# Patient Record
Sex: FEMALE | Race: WHITE | NOT HISPANIC OR LATINO | Employment: OTHER | URBAN - NONMETROPOLITAN AREA
[De-identification: names, ages, dates, MRNs, and addresses within clinical notes are randomized per-mention and may not be internally consistent; named-entity substitution may affect disease eponyms.]

---

## 2017-10-31 ENCOUNTER — OFFICE VISIT (OUTPATIENT)
Dept: URGENT CARE | Facility: PHYSICIAN GROUP | Age: 62
End: 2017-10-31
Payer: OTHER MISCELLANEOUS

## 2017-10-31 VITALS
OXYGEN SATURATION: 94 % | SYSTOLIC BLOOD PRESSURE: 130 MMHG | TEMPERATURE: 97.2 F | HEART RATE: 88 BPM | BODY MASS INDEX: 35.51 KG/M2 | DIASTOLIC BLOOD PRESSURE: 80 MMHG | WEIGHT: 208 LBS | HEIGHT: 64 IN | RESPIRATION RATE: 18 BRPM

## 2017-10-31 DIAGNOSIS — R19.7 DIARRHEA, UNSPECIFIED TYPE: ICD-10-CM

## 2017-10-31 DIAGNOSIS — R10.84 GENERALIZED ABDOMINAL PAIN: ICD-10-CM

## 2017-10-31 DIAGNOSIS — R10.30 LOWER ABDOMINAL PAIN: ICD-10-CM

## 2017-10-31 DIAGNOSIS — E66.9 OBESITY (BMI 35.0-39.9 WITHOUT COMORBIDITY): ICD-10-CM

## 2017-10-31 LAB
APPEARANCE UR: CLEAR
BILIRUB UR STRIP-MCNC: NORMAL MG/DL
COLOR UR AUTO: YELLOW
GLUCOSE UR STRIP.AUTO-MCNC: NORMAL MG/DL
KETONES UR STRIP.AUTO-MCNC: NORMAL MG/DL
LEUKOCYTE ESTERASE UR QL STRIP.AUTO: NORMAL
NITRITE UR QL STRIP.AUTO: NORMAL
PH UR STRIP.AUTO: 6 [PH] (ref 5–8)
PROT UR QL STRIP: NORMAL MG/DL
RBC UR QL AUTO: NORMAL
SP GR UR STRIP.AUTO: 1.03
UROBILINOGEN UR STRIP-MCNC: NORMAL MG/DL

## 2017-10-31 PROCEDURE — 99214 OFFICE O/P EST MOD 30 MIN: CPT | Performed by: FAMILY MEDICINE

## 2017-10-31 PROCEDURE — 81002 URINALYSIS NONAUTO W/O SCOPE: CPT | Performed by: FAMILY MEDICINE

## 2017-10-31 RX ORDER — CIPROFLOXACIN 500 MG/1
TABLET, FILM COATED ORAL
Qty: 10 TAB | Refills: 0 | Status: SHIPPED | OUTPATIENT
Start: 2017-10-31 | End: 2017-11-08

## 2017-10-31 RX ORDER — DIPHENOXYLATE HYDROCHLORIDE AND ATROPINE SULFATE 2.5; .025 MG/1; MG/1
TABLET ORAL
Qty: 20 TAB | Refills: 0 | Status: SHIPPED | OUTPATIENT
Start: 2017-10-31 | End: 2017-11-08

## 2017-10-31 NOTE — PROGRESS NOTES
Chief Complaint:    Chief Complaint   Patient presents with   • Abdominal Pain     cramping, dizziness       History of Present Illness:    This is a new problem. On 10/28/17, she started with watery diarrhea. She is still having diarrhea, 3 times yesterday, and 4 times so far today. Tried Pepto Bismol on 10/28/17 without help. Has fluctuating abdominal pain. She reports the pain has at times been so bad that it's doubled her over. Sometimes she has felt dizzy. No fever, chills, nausea, vomiting, or urine symptoms. S/p appendectomy and hysterectomy, otherwise has all abdominal organs. No close contacts with similar symptoms, recent travel, water activities such as lakes or rivers, or recent antibiotic use.      Review of Systems:    Constitutional: Negative for fever, chills, and diaphoresis.   Eyes: Negative for change in vision, photophobia, pain, redness, and discharge.  ENT: Negative for ear pain, ear discharge, hearing loss, tinnitus, nasal congestion, nosebleeds, and sore throat.    Respiratory: Negative for cough, hemoptysis, sputum production, shortness of breath, wheezing, and stridor.    Cardiovascular: Negative for chest pain, palpitations, orthopnea, claudication, leg swelling, and PND.   Gastrointestinal: See HPI.  Genitourinary: Negative for dysuria, urinary urgency, urinary frequency, hematuria, and flank pain.   Musculoskeletal: Negative for myalgias, joint pain, neck pain, and back pain.   Skin: Negative for rash and itching.   Neurological: See HPI.  Endo: Negative for polydipsia.   Heme: Does not bruise/bleed easily.   Psychiatric/Behavioral: Negative for depression, suicidal ideas, hallucinations, memory loss and substance abuse. The patient is not nervous/anxious and does not have insomnia.      Past Medical History:    History reviewed. No pertinent past medical history.    Past Surgical History:    Past Surgical History:   Procedure Laterality Date   • ABDOMINAL HYSTERECTOMY TOTAL     •  "APPENDECTOMY     • OPEN REDUCTION     • PRIMARY C SECTION         Social History:    Social History     Social History   • Marital status:      Spouse name: N/A   • Number of children: N/A   • Years of education: N/A     Social History Main Topics   • Smoking status: Current Every Day Smoker     Packs/day: 0.25     Types: Cigarettes   • Smokeless tobacco: Never Used   • Alcohol use Yes      Comment: rare   • Drug use: No   • Sexual activity: Yes     Partners: Male     Other Topics Concern   • Not on file     Social History Narrative   • No narrative on file       Family History:    History reviewed. No pertinent family history.    Medications:    Current Outpatient Prescriptions on File Prior to Visit   Medication Sig Dispense Refill   • conjugated estrogen (PREMARIN) 1.25 MG TABS Take 1.25 mg by mouth every day. DAYS 1-25        No current facility-administered medications on file prior to visit.        Allergies:    Allergies   Allergen Reactions   • Pcn [Penicillins]        Vitals:    Vitals:    10/31/17 0905   BP: 130/80   Pulse: 88   Resp: 18   Temp: 36.2 °C (97.2 °F)   SpO2: 94%   Weight: 94.3 kg (208 lb)   Height: 1.626 m (5' 4\")       Physical Exam:    Constitutional: Vital signs reviewed. Appears well-developed and well-nourished. No acute distress.   Eyes: Sclera white, conjunctivae clear. PERRLA.  ENT: External ears normal. External auditory canals normal without discharge. TMs translucent and non-bulging. Hearing normal. Nasal mucosa pink. Lips/teeth are normal. Oral mucosa pink and moist. Posterior pharynx: WNL.  Neck: Neck supple.   Cardiovascular: Regular rate and rhythm. No murmur.   Pulmonary/Chest: Respirations non-labored. Clear to auscultation bilaterally.  Abdomen: Mild generalized tenderness to palpation, she reports worst discomfort is in suprapubic region. Bowel sounds are hyperactive. Soft, non-distended.  Musculoskeletal: Normal gait. Normal range of motion. No muscular atrophy or " weakness.  Neurological: Alert and oriented to person, place, and time. CN 2-12 intact. Muscle tone normal. Coordination normal.   Skin: No rashes or lesions. Warm, dry, normal turgor.  Psychiatric: Normal mood and affect. Behavior is normal. Judgment and thought content normal.     Diagnostics:    POCT URINALYSIS (Order #[id number]) on 10/31/17   Component Results     Component Value Ref Range & Units Status   POC Color yellow Negative Final   POC Appearance clear Negative Final   POC Leukocyte Esterase neg Negative Final   POC Nitrites neg Negative Final   POC Urobiligen neg Negative (0.2) mg/dL Final   POC Protein neg Negative mg/dL Final   POC Urine PH 6.0 5.0 - 8.0 Final   POC Blood neg Negative Final   POC Specific Gravity 1.030 <1.005 - >1.030 Final   POC Ketones neg Negative mg/dL Final   POC Biliruben neg Negative mg/dL Final   POC Glucose neg Negative mg/dL Final   Last Resulted Time   Tue Oct 31, 2017  9:48 AM       Assessment / Plan:    1. Lower abdominal pain  - POCT Urinalysis    2. Diarrhea, unspecified type  - diphenoxylate-atropine (LOMOTIL) 2.5-0.025 MG Tab; 1 TAB EVERY 6 HOURS ONLY IF NEEDED FOR DIARRHEA OR STOMACH CRAMPS.  Dispense: 20 Tab; Refill: 0  - ciprofloxacin (CIPRO) 500 MG Tab; 1 TAB TWICE A DAY X 5 DAYS.  Dispense: 10 Tab; Refill: 0    3. Generalized abdominal pain  - diphenoxylate-atropine (LOMOTIL) 2.5-0.025 MG Tab; 1 TAB EVERY 6 HOURS ONLY IF NEEDED FOR DIARRHEA OR STOMACH CRAMPS.  Dispense: 20 Tab; Refill: 0  - ciprofloxacin (CIPRO) 500 MG Tab; 1 TAB TWICE A DAY X 5 DAYS.  Dispense: 10 Tab; Refill: 0    4. Obesity (BMI 35.0-39.9 without comorbidity)  - Patient identified as having weight management issue.  Appropriate orders and counseling given.      Work note given - was seen in my clinic on 10/31/2017 for medical condition.    Discussed with her DDX and management options.    Discussed possible further evaluation with CT Abdomen and Pelvis, which usually will have to be done in  [place] (as we do not have CT here) and likely will need kidney blood tests done first (likely needs to be done in [place] as we do not have STAT labs here) vs. trial of medications and pursue imaging later if getting worse or not improved with meds.    She prefers to take meds first and defer blood tests/CT for now.    Agreeable to medications prescribed.  report checked - last Rx was Vicodin 5-300 mg #60 on 8/10/15.    Follow-up with PCP, urgent care, or Emergency Room if getting worse or not better with above.

## 2017-10-31 NOTE — LETTER
October 31, 2017         Patient: Loree Rm   YOB: 1955   Date of Visit: 10/31/2017           To Whom it May Concern:    Loree Rm was seen in my clinic on 10/31/2017 for medical condition.    If you have any questions or concerns, please don't hesitate to call.        Sincerely,           Boubacar Turner M.D.  Electronically Signed

## 2017-11-08 ENCOUNTER — APPOINTMENT (OUTPATIENT)
Dept: RADIOLOGY | Facility: MEDICAL CENTER | Age: 62
End: 2017-11-08
Attending: EMERGENCY MEDICINE
Payer: MEDICAID

## 2017-11-08 ENCOUNTER — OFFICE VISIT (OUTPATIENT)
Dept: URGENT CARE | Facility: PHYSICIAN GROUP | Age: 62
End: 2017-11-08
Payer: MEDICAID

## 2017-11-08 ENCOUNTER — HOSPITAL ENCOUNTER (OUTPATIENT)
Facility: MEDICAL CENTER | Age: 62
End: 2017-11-09
Attending: EMERGENCY MEDICINE | Admitting: HOSPITALIST
Payer: MEDICAID

## 2017-11-08 ENCOUNTER — RESOLUTE PROFESSIONAL BILLING HOSPITAL PROF FEE (OUTPATIENT)
Dept: HOSPITALIST | Facility: MEDICAL CENTER | Age: 62
End: 2017-11-08
Payer: MEDICAID

## 2017-11-08 VITALS
WEIGHT: 206 LBS | HEART RATE: 96 BPM | RESPIRATION RATE: 16 BRPM | BODY MASS INDEX: 35.17 KG/M2 | TEMPERATURE: 97.3 F | HEIGHT: 64 IN | SYSTOLIC BLOOD PRESSURE: 134 MMHG | OXYGEN SATURATION: 97 % | DIASTOLIC BLOOD PRESSURE: 88 MMHG

## 2017-11-08 DIAGNOSIS — R07.9 CHEST PAIN, UNSPECIFIED TYPE: ICD-10-CM

## 2017-11-08 DIAGNOSIS — R07.89 CHEST DISCOMFORT: ICD-10-CM

## 2017-11-08 LAB
ALBUMIN SERPL BCP-MCNC: 3.9 G/DL (ref 3.2–4.9)
ALBUMIN/GLOB SERPL: 1.1 G/DL
ALP SERPL-CCNC: 203 U/L (ref 30–99)
ALT SERPL-CCNC: 23 U/L (ref 2–50)
ANION GAP SERPL CALC-SCNC: 9 MMOL/L (ref 0–11.9)
APTT PPP: 28.7 SEC (ref 24.7–36)
AST SERPL-CCNC: 44 U/L (ref 12–45)
BASOPHILS # BLD AUTO: 1.4 % (ref 0–1.8)
BASOPHILS # BLD: 0.12 K/UL (ref 0–0.12)
BILIRUB SERPL-MCNC: 0.4 MG/DL (ref 0.1–1.5)
BNP SERPL-MCNC: 2 PG/ML (ref 0–100)
BUN SERPL-MCNC: 17 MG/DL (ref 8–22)
CALCIUM SERPL-MCNC: 9.3 MG/DL (ref 8.5–10.5)
CHLORIDE SERPL-SCNC: 107 MMOL/L (ref 96–112)
CO2 SERPL-SCNC: 21 MMOL/L (ref 20–33)
CREAT SERPL-MCNC: 0.55 MG/DL (ref 0.5–1.4)
EKG IMPRESSION: NORMAL
EOSINOPHIL # BLD AUTO: 0.49 K/UL (ref 0–0.51)
EOSINOPHIL NFR BLD: 5.6 % (ref 0–6.9)
ERYTHROCYTE [DISTWIDTH] IN BLOOD BY AUTOMATED COUNT: 45.8 FL (ref 35.9–50)
GFR SERPL CREATININE-BSD FRML MDRD: >60 ML/MIN/1.73 M 2
GLOBULIN SER CALC-MCNC: 3.6 G/DL (ref 1.9–3.5)
GLUCOSE SERPL-MCNC: 95 MG/DL (ref 65–99)
HCT VFR BLD AUTO: 44.1 % (ref 37–47)
HGB BLD-MCNC: 14.8 G/DL (ref 12–16)
IMM GRANULOCYTES # BLD AUTO: 0.04 K/UL (ref 0–0.11)
IMM GRANULOCYTES NFR BLD AUTO: 0.5 % (ref 0–0.9)
INR PPP: 1.03 (ref 0.87–1.13)
LIPASE SERPL-CCNC: 27 U/L (ref 11–82)
LYMPHOCYTES # BLD AUTO: 2.7 K/UL (ref 1–4.8)
LYMPHOCYTES NFR BLD: 31 % (ref 22–41)
MCH RBC QN AUTO: 33.2 PG (ref 27–33)
MCHC RBC AUTO-ENTMCNC: 33.6 G/DL (ref 33.6–35)
MCV RBC AUTO: 98.9 FL (ref 81.4–97.8)
MONOCYTES # BLD AUTO: 0.64 K/UL (ref 0–0.85)
MONOCYTES NFR BLD AUTO: 7.3 % (ref 0–13.4)
NEUTROPHILS # BLD AUTO: 4.73 K/UL (ref 2–7.15)
NEUTROPHILS NFR BLD: 54.2 % (ref 44–72)
NRBC # BLD AUTO: 0 K/UL
NRBC BLD AUTO-RTO: 0 /100 WBC
PLATELET # BLD AUTO: 322 K/UL (ref 164–446)
PMV BLD AUTO: 10.1 FL (ref 9–12.9)
POTASSIUM SERPL-SCNC: 4 MMOL/L (ref 3.6–5.5)
PROT SERPL-MCNC: 7.5 G/DL (ref 6–8.2)
PROTHROMBIN TIME: 13.2 SEC (ref 12–14.6)
RBC # BLD AUTO: 4.46 M/UL (ref 4.2–5.4)
SODIUM SERPL-SCNC: 137 MMOL/L (ref 135–145)
TROPONIN I SERPL-MCNC: <0.01 NG/ML (ref 0–0.04)
WBC # BLD AUTO: 8.7 K/UL (ref 4.8–10.8)

## 2017-11-08 PROCEDURE — 93000 ELECTROCARDIOGRAM COMPLETE: CPT | Performed by: FAMILY MEDICINE

## 2017-11-08 PROCEDURE — 99214 OFFICE O/P EST MOD 30 MIN: CPT | Performed by: FAMILY MEDICINE

## 2017-11-08 PROCEDURE — 99220 PR INITIAL OBSERVATION CARE,LEVL III: CPT | Performed by: HOSPITALIST

## 2017-11-08 PROCEDURE — 83690 ASSAY OF LIPASE: CPT

## 2017-11-08 PROCEDURE — 83880 ASSAY OF NATRIURETIC PEPTIDE: CPT

## 2017-11-08 PROCEDURE — 85730 THROMBOPLASTIN TIME PARTIAL: CPT

## 2017-11-08 PROCEDURE — 80053 COMPREHEN METABOLIC PANEL: CPT

## 2017-11-08 PROCEDURE — 84484 ASSAY OF TROPONIN QUANT: CPT

## 2017-11-08 PROCEDURE — G0378 HOSPITAL OBSERVATION PER HR: HCPCS

## 2017-11-08 PROCEDURE — 99285 EMERGENCY DEPT VISIT HI MDM: CPT

## 2017-11-08 PROCEDURE — 85610 PROTHROMBIN TIME: CPT

## 2017-11-08 PROCEDURE — 36415 COLL VENOUS BLD VENIPUNCTURE: CPT

## 2017-11-08 PROCEDURE — 71010 DX-CHEST-LIMITED (1 VIEW): CPT

## 2017-11-08 PROCEDURE — 85025 COMPLETE CBC W/AUTO DIFF WBC: CPT

## 2017-11-08 PROCEDURE — 93005 ELECTROCARDIOGRAM TRACING: CPT | Performed by: EMERGENCY MEDICINE

## 2017-11-08 RX ORDER — CIPROFLOXACIN 500 MG/1
500 TABLET, FILM COATED ORAL 2 TIMES DAILY
COMMUNITY
Start: 2017-10-31 | End: 2019-03-05

## 2017-11-08 RX ORDER — PROMETHAZINE HYDROCHLORIDE 25 MG/1
12.5-25 TABLET ORAL EVERY 4 HOURS PRN
Status: DISCONTINUED | OUTPATIENT
Start: 2017-11-08 | End: 2017-11-09 | Stop reason: HOSPADM

## 2017-11-08 RX ORDER — POLYETHYLENE GLYCOL 3350 17 G/17G
1 POWDER, FOR SOLUTION ORAL
Status: DISCONTINUED | OUTPATIENT
Start: 2017-11-08 | End: 2017-11-09 | Stop reason: HOSPADM

## 2017-11-08 RX ORDER — ONDANSETRON 2 MG/ML
4 INJECTION INTRAMUSCULAR; INTRAVENOUS EVERY 4 HOURS PRN
Status: DISCONTINUED | OUTPATIENT
Start: 2017-11-08 | End: 2017-11-09 | Stop reason: HOSPADM

## 2017-11-08 RX ORDER — ASPIRIN 81 MG/1
324 TABLET, CHEWABLE ORAL ONCE
Status: COMPLETED | OUTPATIENT
Start: 2017-11-08 | End: 2017-11-08

## 2017-11-08 RX ORDER — IBUPROFEN 200 MG
400 TABLET ORAL 3 TIMES DAILY
COMMUNITY
End: 2019-03-05

## 2017-11-08 RX ORDER — DIPHENOXYLATE HYDROCHLORIDE AND ATROPINE SULFATE 2.5; .025 MG/1; MG/1
1 TABLET ORAL 4 TIMES DAILY PRN
COMMUNITY
End: 2019-03-05

## 2017-11-08 RX ORDER — AMOXICILLIN 250 MG
2 CAPSULE ORAL 2 TIMES DAILY
Status: DISCONTINUED | OUTPATIENT
Start: 2017-11-08 | End: 2017-11-09 | Stop reason: HOSPADM

## 2017-11-08 RX ORDER — BISACODYL 10 MG
10 SUPPOSITORY, RECTAL RECTAL
Status: DISCONTINUED | OUTPATIENT
Start: 2017-11-08 | End: 2017-11-09 | Stop reason: HOSPADM

## 2017-11-08 RX ORDER — PROMETHAZINE HYDROCHLORIDE 25 MG/1
12.5-25 SUPPOSITORY RECTAL EVERY 4 HOURS PRN
Status: DISCONTINUED | OUTPATIENT
Start: 2017-11-08 | End: 2017-11-09 | Stop reason: HOSPADM

## 2017-11-08 RX ORDER — ONDANSETRON 4 MG/1
4 TABLET, ORALLY DISINTEGRATING ORAL EVERY 4 HOURS PRN
Status: DISCONTINUED | OUTPATIENT
Start: 2017-11-08 | End: 2017-11-09 | Stop reason: HOSPADM

## 2017-11-08 RX ADMIN — ASPIRIN 324 MG: 81 TABLET, CHEWABLE ORAL at 10:00

## 2017-11-08 ASSESSMENT — PATIENT HEALTH QUESTIONNAIRE - PHQ9
SUM OF ALL RESPONSES TO PHQ9 QUESTIONS 1 AND 2: 0
1. LITTLE INTEREST OR PLEASURE IN DOING THINGS: NOT AT ALL
1. LITTLE INTEREST OR PLEASURE IN DOING THINGS: NOT AT ALL
SUM OF ALL RESPONSES TO PHQ QUESTIONS 1-9: 0
SUM OF ALL RESPONSES TO PHQ9 QUESTIONS 1 AND 2: 0
SUM OF ALL RESPONSES TO PHQ QUESTIONS 1-9: 0
2. FEELING DOWN, DEPRESSED, IRRITABLE, OR HOPELESS: NOT AT ALL
2. FEELING DOWN, DEPRESSED, IRRITABLE, OR HOPELESS: NOT AT ALL

## 2017-11-08 ASSESSMENT — ENCOUNTER SYMPTOMS
FOCAL WEAKNESS: 0
NAUSEA: 0
SHORTNESS OF BREATH: 0
CHILLS: 0
FEVER: 0
WHEEZING: 0
DIZZINESS: 0
ORTHOPNEA: 0
BACK PAIN: 1
VOMITING: 0
DIAPHORESIS: 1
SPUTUM PRODUCTION: 0
COUGH: 0

## 2017-11-08 ASSESSMENT — PAIN SCALES - GENERAL
PAINLEVEL_OUTOF10: 2
PAINLEVEL_OUTOF10: 3
PAINLEVEL_OUTOF10: 2

## 2017-11-08 ASSESSMENT — LIFESTYLE VARIABLES
ALCOHOL_USE: NO
DO YOU DRINK ALCOHOL: NO
EVER_SMOKED: YES

## 2017-11-08 NOTE — PROGRESS NOTES
"Subjective:      Loree Rm is a 62 y.o. female who presents with Chills and Back Pain (chest tightness)    Chief Complaint   Patient presents with   • Chills   • Back Pain     chest tightness        - This is a very pleasant 62 y.o. 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 Radiates to back, no nausea but felt clammy/sweaty a little. Symptoms still present but pain is 5/10. She is a smoker.           ALLERGIES:  Pcn [penicillins]     PMH:  No past medical history on file.     MEDS:  No current outpatient prescriptions on file.    ** I have documented what I find to be significant in regards to past medical, social, family and surgical history  in my HPI or under PMH/PSH/FH review section, otherwise it is contributory **           HPI    Review of Systems   Constitutional: Positive for diaphoresis. Negative for chills and fever.   Respiratory: Negative for cough, sputum production, shortness of breath and wheezing.    Cardiovascular: Positive for chest pain. Negative for orthopnea and leg swelling.   Gastrointestinal: Negative for nausea and vomiting.   Musculoskeletal: Positive for back pain.   Neurological: Negative for dizziness and focal weakness.          Objective:     /88   Pulse 96   Temp 36.3 °C (97.3 °F)   Resp 16   Ht 1.626 m (5' 4\")   Wt 93.4 kg (206 lb)   SpO2 97%   BMI 35.36 kg/m²      Physical Exam   Constitutional: She appears well-developed. No distress.   HENT:   Head: Normocephalic and atraumatic.   Mouth/Throat: Oropharynx is clear and moist.   Eyes: Conjunctivae are normal.   Neck: Neck supple.   Cardiovascular: Regular rhythm.    No murmur heard.  Pulmonary/Chest: Effort normal and breath sounds normal. No respiratory distress.   Neurological: She is alert. She exhibits normal muscle tone.   Skin: Skin is warm and dry.   Psychiatric: She has a normal mood and affect. Judgment normal.   Nursing note and " vitals reviewed.              Assessment/Plan:         1. Chest discomfort  EKG - Clinic performed    aspirin (ASA) chewable tab 324 mg       Pain resolved w/ NTG  Sent to [facility] ER w/ EMS. Will call report

## 2017-11-08 NOTE — ED NOTES
"Med rec updated and complete  Allergies reviewed  Pt states \"I take IBUPROFEN 200MG 2 tablet 3 times a day\".  Pt states \"No vitamins\".    "

## 2017-11-08 NOTE — ED NOTES
Pt bib ems from .  Chief Complaint   Patient presents with   • Chest Pain     left sided, while exerting herself at work today @ 8am, radiating across to right side of chest, pt states she got sweaty and felt like she was going to pass out, pain is intermittent now, better after NTG x3 and  mg po     Pt in a gown, connected to monitor, chart up for ERP.

## 2017-11-08 NOTE — PROGRESS NOTES
Patient on Room T204. Alert and oriented. Still have chest pain 3 / 10. Sinus Rhythm on cardiac monitor.

## 2017-11-08 NOTE — ED PROVIDER NOTES
ED Provider Note    Scribed for Selam Pedro M.D. by Glenn Kasper. 11/8/2017, 11:58 AM.    Primary care provider: Pcp Pt States None  Means of arrival: EMS  History obtained from: patient  History limited by: none    CHIEF COMPLAINT  Chief Complaint   Patient presents with   • Chest Pain     left sided, while exerting herself at work today @ 8am, radiating across to right side of chest, pt states she got sweaty and felt like she was going to pass out, pain is intermittent now, better after NTG x3 and  mg po       HPI  Loree Rm is a 62 y.o. female who presents to the Emergency Department complaining of sudden left sided chest pain starting 4 hours ago. Patient describes her chest pain as pressure that was 8/10 severity initially. Se states that her chest pain radiated across to right side of chest. EMS was called, who administered 3 doses of nitroglycerin. Patient states that this improved her chest pain, however it has returned intermittently at 3/10 severity. She reports associated diaphoresis. Patient is a daily cigarette user. She denies neck pain, nausea, vomiting, leg pain, leg swelling, vision changes, cardiac history, family cardiac history, history of hyperlipidema.    REVIEW OF SYSTEMS  Pertinent positives include chest pain, diaphoresis. Pertinent negatives include no neck pain, nausea, vomiting, leg pain, leg swelling, vision changes.  All other systems reviewed and negative.  C.    PAST MEDICAL HISTORY   None noted    SURGICAL HISTORY   has a past surgical history that includes primary c section; abdominal hysterectomy total; appendectomy; and open reduction.    SOCIAL HISTORY  Social History   Substance Use Topics   • Smoking status: Current Every Day Smoker     Packs/day: 0.25     Types: Cigarettes   • Smokeless tobacco: Never Used   • Alcohol use Yes      Comment: rare      History   Drug Use No       FAMILY HISTORY  None noted    CURRENT MEDICATIONS  Home Medications     Reviewed by  "Jeanette Yuong PhT (Pharmacy Tech) on 11/08/17 at 1404  Med List Status: Complete   Medication Last Dose Status   ciprofloxacin (CIPRO) 500 MG Tab 11/4/2017 Active   diphenoxylate-atropine (LOMOTIL) 2.5-0.025 MG Tab 11/2/2017 Active   ibuprofen (MOTRIN) 200 MG Tab 11/8/2017 Active                ALLERGIES  Allergies   Allergen Reactions   • Pcn [Penicillins] Anaphylaxis       PHYSICAL EXAM  VITAL SIGNS: /57   Pulse 66   Temp 36.4 °C (97.5 °F)   Resp 16   Ht 1.626 m (5' 4\")   Wt 93.4 kg (206 lb)   BMI 35.36 kg/m²   Constitutional: Alert in no apparent distress.   HENT: No signs of trauma, Bilateral external ears normal, Nose normal.   Eyes: Pupils are equal and reactive, Conjunctiva normal, Non-icteric.   Neck: Normal range of motion, No tenderness, Supple, No stridor.   Cardiovascular: Regular rate and rhythm, no murmurs.   Thorax & Lungs: Normal breath sounds, No respiratory distress, No wheezing, No chest tenderness.   Abdomen: Bowel sounds normal, Soft, No tenderness, No masses, No peritoneal signs.  Skin: Warm, Dry, No erythema, No rash.    Musculoskeletal:  No major deformities noted.   Neurologic: Alert, moving all extremities without difficulty, no focal deficits.    LABS  Results for orders placed or performed during the hospital encounter of 11/08/17   Troponin   Result Value Ref Range    Troponin I <0.01 0.00 - 0.04 ng/mL   Btype Natriuretic Peptide   Result Value Ref Range    B Natriuretic Peptide 2 0 - 100 pg/mL   CBC with Differential   Result Value Ref Range    WBC 8.7 4.8 - 10.8 K/uL    RBC 4.46 4.20 - 5.40 M/uL    Hemoglobin 14.8 12.0 - 16.0 g/dL    Hematocrit 44.1 37.0 - 47.0 %    MCV 98.9 (H) 81.4 - 97.8 fL    MCH 33.2 (H) 27.0 - 33.0 pg    MCHC 33.6 33.6 - 35.0 g/dL    RDW 45.8 35.9 - 50.0 fL    Platelet Count 322 164 - 446 K/uL    MPV 10.1 9.0 - 12.9 fL    Neutrophils-Polys 54.20 44.00 - 72.00 %    Lymphocytes 31.00 22.00 - 41.00 %    Monocytes 7.30 0.00 - 13.40 %    Eosinophils " 5.60 0.00 - 6.90 %    Basophils 1.40 0.00 - 1.80 %    Immature Granulocytes 0.50 0.00 - 0.90 %    Nucleated RBC 0.00 /100 WBC    Neutrophils (Absolute) 4.73 2.00 - 7.15 K/uL    Lymphs (Absolute) 2.70 1.00 - 4.80 K/uL    Monos (Absolute) 0.64 0.00 - 0.85 K/uL    Eos (Absolute) 0.49 0.00 - 0.51 K/uL    Baso (Absolute) 0.12 0.00 - 0.12 K/uL    Immature Granulocytes (abs) 0.04 0.00 - 0.11 K/uL    NRBC (Absolute) 0.00 K/uL   Complete Metabolic Panel (CMP)   Result Value Ref Range    Sodium 137 135 - 145 mmol/L    Potassium 4.0 3.6 - 5.5 mmol/L    Chloride 107 96 - 112 mmol/L    Co2 21 20 - 33 mmol/L    Anion Gap 9.0 0.0 - 11.9    Glucose 95 65 - 99 mg/dL    Bun 17 8 - 22 mg/dL    Creatinine 0.55 0.50 - 1.40 mg/dL    Calcium 9.3 8.5 - 10.5 mg/dL    AST(SGOT) 44 12 - 45 U/L    ALT(SGPT) 23 2 - 50 U/L    Alkaline Phosphatase 203 (H) 30 - 99 U/L    Total Bilirubin 0.4 0.1 - 1.5 mg/dL    Albumin 3.9 3.2 - 4.9 g/dL    Total Protein 7.5 6.0 - 8.2 g/dL    Globulin 3.6 (H) 1.9 - 3.5 g/dL    A-G Ratio 1.1 g/dL   Prothrombin Time   Result Value Ref Range    PT 13.2 12.0 - 14.6 sec    INR 1.03 0.87 - 1.13   APTT   Result Value Ref Range    APTT 28.7 24.7 - 36.0 sec   Lipase   Result Value Ref Range    Lipase 27 11 - 82 U/L   ESTIMATED GFR   Result Value Ref Range    GFR If African American >60 >60 mL/min/1.73 m 2    GFR If Non African American >60 >60 mL/min/1.73 m 2   EKG (NOW)   Result Value Ref Range    Report       [facility] Emergency Dept.    Test Date:  2017  Pt Name:    PONCE SALAZAR                  Department: ER  MRN:        [id number]                      Room:       [facility]  Gender:     F                            Technician: [id number]  :        1955                   Requested By:ER TRIAGE PROTOCOL  Order #:    [id number]                    Reading MD:    Measurements  Intervals                                Axis  Rate:       65                           P:          41  CT:         200                           QRS:        -20  QRSD:       88                           T:          1  QT:         448  QTc:        466    Interpretive Statements  SINUS RHYTHM  BORDERLINE AV CONDUCTION DELAY  LEFT VENTRICULAR HYPERTROPHY  NONSPECIFIC T ABNORMALITIES, INFERIOR LEADS  BASELINE WANDER IN LEAD(S) V2,V3  No previous ECG available for comparison     All labs reviewed by me.    EKG  12 Lead EKG interpreted by me to show:  Normal sinus rhythm   Rate 65  Axis: Normal  Intervals: Normal  T wave flattening Avf in  V 3- V6  Normal ST segments  My impression of this EKG: Non-specific T wave changes without ischemia.        RADIOLOGY  DX-CHEST-LIMITED (1 VIEW)   Final Result      No acute cardiopulmonary abnormality identified.      The radiologist's interpretation of all radiological studies have been reviewed by me.    COURSE & MEDICAL DECISION MAKING  Pertinent Labs & Imaging studies reviewed. (See chart for details)    Differential diagnoses include but are not limited to: MI, ACS, unstable angina, PE, aortic dissection, panic attack, rhythm abnormality,     11:58 AM - Patient seen and examined at bedside. Ordered DX chest, Estimated GFR, Troponin, BNP, CBC with differential, CMP, PT/INR, APTT, Lipase to evaluate her symptoms.   Patient has a Heart score 4.     12:56 PM - Recheck: Patient re-evaluated at beside by me. Patient will be admitted. The patient's labs are unremarkable troponin is negative BNP is negative. She is currently chest pain-free. She has been given aspirin prehospital.    1:02 PM - Paged CDU hospitalist.     1:12 PM - I discussed the patient's case and the above findings with Dr. Catherine (hospitalist) who agrees to admit the patient.      DISPOSITION:  Patient will be admitted to hospital in guarded condition.      FINAL IMPRESSION  1. Chest pain, unspecified type         This dictation has been created using voice recognition software and/or scribes. The accuracy of the dictation is limited by the  abilities of the software and the expertise of the scribes. I expect there may be some errors of grammar and possibly content. I made every attempt to manually correct the errors within my dictation. However, errors related to voice recognition software and/or scribes may still exist and should be interpreted within the appropriate context.     IGlenn (Scribe), am scribing for, and in the presence of, Selam Pedro M.D..    Electronically signed by: Glenn Kasper (Scribe), 11/8/2017    ISelam M.D. personally performed the services described in this documentation, as scribed by Glenn Kasper in my presence, and it is both accurate and complete.    The note accurately reflects work and decisions made by me.  Selam Pedro  11/8/2017  4:06 PM

## 2017-11-09 ENCOUNTER — APPOINTMENT (OUTPATIENT)
Dept: RADIOLOGY | Facility: MEDICAL CENTER | Age: 62
End: 2017-11-09
Attending: HOSPITALIST
Payer: MEDICAID

## 2017-11-09 VITALS
WEIGHT: 203.26 LBS | DIASTOLIC BLOOD PRESSURE: 71 MMHG | TEMPERATURE: 97.9 F | HEART RATE: 73 BPM | OXYGEN SATURATION: 93 % | RESPIRATION RATE: 16 BRPM | SYSTOLIC BLOOD PRESSURE: 152 MMHG | BODY MASS INDEX: 34.7 KG/M2 | HEIGHT: 64 IN

## 2017-11-09 PROBLEM — R07.9 CHEST PAIN: Status: RESOLVED | Noted: 2017-11-08 | Resolved: 2017-11-09

## 2017-11-09 PROBLEM — I10 HTN (HYPERTENSION), BENIGN: Status: ACTIVE | Noted: 2017-11-09

## 2017-11-09 PROCEDURE — 99217 PR OBSERVATION CARE DISCHARGE: CPT | Performed by: HOSPITALIST

## 2017-11-09 PROCEDURE — 700111 HCHG RX REV CODE 636 W/ 250 OVERRIDE (IP)

## 2017-11-09 PROCEDURE — G0378 HOSPITAL OBSERVATION PER HR: HCPCS

## 2017-11-09 PROCEDURE — A9502 TC99M TETROFOSMIN: HCPCS

## 2017-11-09 RX ORDER — REGADENOSON 0.08 MG/ML
INJECTION, SOLUTION INTRAVENOUS
Status: COMPLETED
Start: 2017-11-09 | End: 2017-11-09

## 2017-11-09 RX ORDER — AMLODIPINE BESYLATE 5 MG/1
5 TABLET ORAL DAILY
Qty: 30 TAB | Refills: 3 | Status: SHIPPED | OUTPATIENT
Start: 2017-11-09 | End: 2020-09-09

## 2017-11-09 RX ADMIN — REGADENOSON 0.4 MG: 0.08 INJECTION, SOLUTION INTRAVENOUS at 10:12

## 2017-11-09 ASSESSMENT — PAIN SCALES - GENERAL: PAINLEVEL_OUTOF10: 0

## 2017-11-09 NOTE — PROGRESS NOTES
Assumed care at 1900.  Report received from Denise HENNESSY. Assessment complete, plan of care Discussed and understood.  Pt denies chest pain at this time.  Pt educated on call light pt verbalizes understanding.  Pt denies any additional needs at this time.  Call light and phone within reach. Will continue to monitor.

## 2017-11-09 NOTE — PROGRESS NOTES
Patient is resting with eyes closed rise and fall of chest observed. No signs of pain or distress noted. SR on heart monitor. VS stable.

## 2017-11-09 NOTE — PROGRESS NOTES
Pt in bed,a&ox4,v/s wnl,tele with SR,poc explained,no c/o chest pain now,pt kept npo for stress test scheduled at 9am

## 2017-11-09 NOTE — PROGRESS NOTES
To whom it may concern,     Loree Rm was under my medical care from 11/8-11/9/2017 at [facility]. She was released on 11/9/2017. I recommend absence from work until Monday Nov 13th, 2017.    She may resume work on Monday, Nov 13th, 2017. No restrictions      Questions??      [phone]          Nir Catherine MD

## 2017-11-09 NOTE — DISCHARGE INSTRUCTIONS
Discharge Instructions    Discharged to home by car with relative. Discharged via walking, hospital escort: Yes.  Special equipment needed: Not Applicable    Be sure to schedule a follow-up appointment with your primary care doctor or any specialists as instructed.     Discharge Plan:   Diet Plan: Discussed  Activity Level: Discussed  Smoking Cessation Offered: Patient Counseled  Confirmed Follow up Appointment: Patient to Call and Schedule Appointment  Confirmed Symptoms Management: Discussed  Medication Reconciliation Updated: Yes  Influenza Vaccine Indication: Patient Refuses    I understand that a diet low in cholesterol, fat, and sodium is recommended for good health. Unless I have been given specific instructions below for another diet, I accept this instruction as my diet prescription.   Other diet:     Special Instructions: None    · Is patient discharged on Warfarin / Coumadin?   No     · Is patient Post Blood Transfusion?  No    Depression / Suicide Risk    As you are discharged from this [facility] facility, it is important to learn how to keep safe from harming yourself.    Recognize the warning signs:  · Abrupt changes in personality, positive or negative- including increase in energy   · Giving away possessions  · Change in eating patterns- significant weight changes-  positive or negative  · Change in sleeping patterns- unable to sleep or sleeping all the time   · Unwillingness or inability to communicate  · Depression  · Unusual sadness, discouragement and loneliness  · Talk of wanting to die  · Neglect of personal appearance   · Rebelliousness- reckless behavior  · Withdrawal from people/activities they love  · Confusion- inability to concentrate     If you or a loved one observes any of these behaviors or has concerns about self-harm, here's what you can do:  · Talk about it- your feelings and reasons for harming yourself  · Remove any means that you might use to hurt yourself (examples: pills,  rope, extension cords, firearm)  · Get professional help from the community (Mental Health, Substance Abuse, psychological counseling)  · Do not be alone:Call your Safe Contact- someone whom you trust who will be there for you.  · Call your local CRISIS HOTLINE 424-4316 or 909-186-4037  · Call your local Children's Mobile Crisis Response Team [place] (690) 437-3209 or www."[employer]  · Call the toll free National Suicide Prevention Hotlines   · National Suicide Prevention Lifeline 417-917-CBYX (1726)  · National Hope Line Network 800-SUICIDE (928-1455)

## 2017-11-09 NOTE — H&P
DATE OF SERVICE:  2017    REASON FOR EVALUATION:  Chest discomfort.    PRIMARY CARE PROVIDER:  None.    HISTORY OF PRESENT ILLNESS:  This is a 62-year-old female with no significant   past medical history, who comes to the emergency room stating that while she   was at work she started having chest discomfort.  Chest discomfort described   as pressure like pain across her chest, intensity 8/10.  She does do heavy   lifting at work.  It lasted for 15 minutes.  She was  short of breath, but   no nausea, no vomiting, no palpitations. A #911 was called, [facility] arrived.    They administered 3 doses of nitroglycerin.  Nitroglycerin did improve her   pain.  She was brought to the emergency room.  Her workup in the emergency   room was unremarkable.  Patient referred to me for further care and   management.    PAST MEDICAL HISTORY:  Hx of  tobacco dependence.    PAST SURGICAL HISTORY:  Right arm surgery, right knee surgery, appendectomy,   hysterectomy, .    SOCIAL HISTORY:  She smokes 6-8 cigarettes a day for greater than 12 years.    Does not drink any alcohol or illicit drugs.    FAMILY HISTORY:  Mother had a heart murmur, irregular heart rhythm, and   hypertension.    ALLERGIES:  PENICILLIN.    OUTPATIENT MEDICATIONS:  Ibuprofen p.r.n.    REVIEW OF SYSTEMS:  Positive for chest discomfort, diaphoresis, shortness of   breath.  No nausea, no vomiting, no fever or chills.  No abdominal pain,   constipation, diarrhea.  No leg swelling.  No focal deficits.  No headache.    Review of systems otherwise negative.    PHYSICAL EXAMINATION:  VITAL SIGNS:  Blood pressure 142/70, respiration 20, pulse 74, temperature   36.4.  GENERAL:   female, not in acute distress.  HEENT:  Sclerae are anicteric.  Extraocular movement intact.  Oral mucosa is   moist.  NECK:  Supple.  HEART:  S1, S2, regular rate, no murmurs appreciated.  LUNGS:  Clear.  No rales or wheezing.  ABDOMEN:  Soft, nontender, positive bowel sounds  appreciated.  EXTREMITIES:  No edema noted.  No cyanosis or clubbing.  NEUROLOGIC:  She is alert, awake, oriented, no focal deficits.    LABORATORY DATA:  White cell count is 8, hemoglobin 15, hematocrit of 44,   platelet count is 332.  Sodium  137 potassium 4, BUN is 17, creatinine 0.55.    Troponin I less than 0.01 x  2.  X-ray of chest is clear.  EKG reviewed   by me, normal sinus rhythm, LVH, loss of R waves in anterior leads.    IMPRESSION:  1.  Status Chest pain, rule out myocardial  infarction.  2.  History of tobacco dependence.  3.  Abnormal EKG.    PLAN:  Smoking cessation counseled with this patient.  Patient will be   monitored on telemetry.  Check troponins x2.  If troponins are negative,   patient will go for Persantine thallium stress test.  If Pthal is abnormal, patient   will be referred to cardiology to monitor and control blood pressure.  Patient   will be admitted to the CDU unit/clinical decision unit.       ____________________________________     MD PUJA HALEY / LUPE    DD:  11/08/2017 22:32:06  DT:  11/09/2017 02:43:43    D#:  [id number]  Job#:  [id number]

## 2017-11-10 NOTE — DISCHARGE SUMMARY
CHIEF COMPLAINT ON ADMISSION  Chief Complaint   Patient presents with   • Chest Pain     left sided, while exerting herself at work today @ 8am, radiating across to right side of chest, pt states she got sweaty and felt like she was going to pass out, pain is intermittent now, better after NTG x3 and  mg po       CODE STATUS  Prior    HPI & HOSPITAL COURSE  HISTORY OF PRESENT ILLNESS:  This is a 62-year-old female with no significant   past medical history, who comes to the emergency room stating that while she   was at work she started having chest discomfort.  Chest discomfort described   as pressure like pain across her chest, intensity 8/10.  She does do heavy   lifting at work.  It lasted for 15 minutes.  She was short of breath, but   no nausea, no vomiting, no palpitations. A #911 was called, [facility] arrived.    They administered 3 doses of nitroglycerin.  Nitroglycerin did improve her   pain.  She was brought to the emergency room.  Her workup in the emergency   room was unremarkable.  Patient referred to me for further care and   Management.    Monitored on tele. troponins negative. pthall negative. Chest pain free. bp slightly elevated , she was started on po norvasc    Therefore, she is discharged in good and stable condition with close outpatient follow-up.    SPECIFIC OUTPATIENT FOLLOW-UP  pcp 1 week    DISCHARGE PROBLEM LIST  Active Problems:    HTN (hypertension), benign POA: Unknown  Resolved Problems:    Chest pain POA: Unknown      FOLLOW UP  No future appointments.  Pcp Pt States None            MEDICATIONS ON DISCHARGE   Loree Rm   [facility] Medication Instructions EZEQUIEL:[id number]    Printed on:11/10/17 0801   Medication Information                      amlodipine (NORVASC) 5 MG Tab  Take 1 Tab by mouth every day.             ciprofloxacin (CIPRO) 500 MG Tab  Take 500 mg by mouth 2 times a day. Pt started on 10/31/2017 for 5 day course             diphenoxylate-atropine (LOMOTIL) 2.5-0.025 MG  Tab  Take 1 Tab by mouth 4 times a day as needed for Diarrhea.             ibuprofen (MOTRIN) 200 MG Tab  Take 400 mg by mouth 3 times a day. Pt states she takes 400mg TID                  DIET  No orders of the defined types were placed in this encounter.      ACTIVITY  As tolerated.  Weight bearing as tolerated      CONSULTATIONS  none    PROCEDURES   Myocardial Perfusion   Report   NUCLEAR IMAGING INTERPRETATION   No evidence of infarct or reversible ischemia..   Normal left ventricular size, ejection fraction, and wall motion.   ECG INTERPRETATION   Negative stress ECG for ischemia.     PONCE SALAZAR    LABORATORY  Lab Results   Component Value Date/Time    SODIUM 137 11/08/2017 12:00 PM    POTASSIUM 4.0 11/08/2017 12:00 PM    CHLORIDE 107 11/08/2017 12:00 PM    CO2 21 11/08/2017 12:00 PM    GLUCOSE 95 11/08/2017 12:00 PM    BUN 17 11/08/2017 12:00 PM    CREATININE 0.55 11/08/2017 12:00 PM        Lab Results   Component Value Date/Time    WBC 8.7 11/08/2017 12:00 PM    HEMOGLOBIN 14.8 11/08/2017 12:00 PM    HEMATOCRIT 44.1 11/08/2017 12:00 PM    PLATELETCT 322 11/08/2017 12:00 PM        Total time of the discharge process exceeds 38 minutes

## 2018-04-23 ENCOUNTER — NON-PROVIDER VISIT (OUTPATIENT)
Dept: OCCUPATIONAL MEDICINE | Facility: CLINIC | Age: 63
End: 2018-04-23

## 2018-04-23 DIAGNOSIS — Z11.1 ENCOUNTER FOR PPD TEST: Primary | ICD-10-CM

## 2018-04-23 PROCEDURE — 86580 TB INTRADERMAL TEST: CPT | Performed by: PREVENTIVE MEDICINE

## 2018-04-25 ENCOUNTER — NON-PROVIDER VISIT (OUTPATIENT)
Dept: OCCUPATIONAL MEDICINE | Facility: CLINIC | Age: 63
End: 2018-04-25

## 2018-04-25 LAB — TB WHEAL 3D P 5 TU DIAM: NORMAL MM

## 2018-04-30 ENCOUNTER — NON-PROVIDER VISIT (OUTPATIENT)
Dept: OCCUPATIONAL MEDICINE | Facility: CLINIC | Age: 63
End: 2018-04-30

## 2018-04-30 ENCOUNTER — OFFICE VISIT (OUTPATIENT)
Dept: OCCUPATIONAL MEDICINE | Facility: CLINIC | Age: 63
End: 2018-04-30

## 2018-04-30 VITALS
DIASTOLIC BLOOD PRESSURE: 88 MMHG | WEIGHT: 209 LBS | BODY MASS INDEX: 37.03 KG/M2 | HEIGHT: 63 IN | RESPIRATION RATE: 12 BRPM | SYSTOLIC BLOOD PRESSURE: 130 MMHG | OXYGEN SATURATION: 93 % | TEMPERATURE: 98.2 F | HEART RATE: 92 BPM

## 2018-04-30 DIAGNOSIS — Z11.1 ENCOUNTER FOR PPD TEST: ICD-10-CM

## 2018-04-30 DIAGNOSIS — Z02.89 VISIT FOR OCCUPATIONAL HEALTH EXAMINATION: ICD-10-CM

## 2018-04-30 PROCEDURE — 86580 TB INTRADERMAL TEST: CPT | Performed by: PREVENTIVE MEDICINE

## 2018-04-30 PROCEDURE — 8915 PR COMPREHENSIVE PHYSICAL: Performed by: PREVENTIVE MEDICINE

## 2018-04-30 ASSESSMENT — VISUAL ACUITY
OD_CC: 20/25
OS_CC: 20/30

## 2018-05-03 ENCOUNTER — NON-PROVIDER VISIT (OUTPATIENT)
Dept: OCCUPATIONAL MEDICINE | Facility: CLINIC | Age: 63
End: 2018-05-03

## 2018-05-03 DIAGNOSIS — Z11.1 ENCOUNTER FOR PPD SKIN TEST READING: ICD-10-CM

## 2018-05-03 LAB — TB WHEAL 3D P 5 TU DIAM: NORMAL MM

## 2018-07-14 ENCOUNTER — HOSPITAL ENCOUNTER (OUTPATIENT)
Dept: RADIOLOGY | Facility: MEDICAL CENTER | Age: 63
End: 2018-07-14
Attending: FAMILY MEDICINE
Payer: COMMERCIAL

## 2018-07-14 ENCOUNTER — OCCUPATIONAL MEDICINE (OUTPATIENT)
Dept: URGENT CARE | Facility: PHYSICIAN GROUP | Age: 63
End: 2018-07-14
Payer: COMMERCIAL

## 2018-07-14 VITALS
TEMPERATURE: 98.4 F | RESPIRATION RATE: 16 BRPM | BODY MASS INDEX: 37.03 KG/M2 | OXYGEN SATURATION: 95 % | HEIGHT: 63 IN | HEART RATE: 70 BPM | WEIGHT: 209 LBS | DIASTOLIC BLOOD PRESSURE: 70 MMHG | SYSTOLIC BLOOD PRESSURE: 130 MMHG

## 2018-07-14 DIAGNOSIS — Z02.1 PRE-EMPLOYMENT DRUG SCREENING: ICD-10-CM

## 2018-07-14 DIAGNOSIS — M79.645 PAIN OF FINGER OF LEFT HAND: ICD-10-CM

## 2018-07-14 LAB
AMP AMPHETAMINE: NORMAL
BAR BARBITURATES: NORMAL
BZO BENZODIAZEPINES: NORMAL
COC COCAINE: NORMAL
INT CON NEG: NORMAL
INT CON POS: NORMAL
MDMA ECSTASY: NORMAL
MET METHAMPHETAMINES: NORMAL
MTD METHADONE: NORMAL
OPI OPIATES: NORMAL
OXY OXYCODONE: NORMAL
PCP PHENCYCLIDINE: NORMAL
POC URINE DRUG SCREEN OCDRS: NEGATIVE
THC: NORMAL

## 2018-07-14 PROCEDURE — 80305 DRUG TEST PRSMV DIR OPT OBS: CPT | Performed by: FAMILY MEDICINE

## 2018-07-14 PROCEDURE — 73140 X-RAY EXAM OF FINGER(S): CPT | Mod: LT

## 2018-07-14 PROCEDURE — 99213 OFFICE O/P EST LOW 20 MIN: CPT | Performed by: FAMILY MEDICINE

## 2018-07-14 ASSESSMENT — PAIN SCALES - GENERAL: PAINLEVEL: 2=MINIMAL-SLIGHT

## 2018-07-14 NOTE — LETTER
"EMPLOYEE’S CLAIM FOR COMPENSATION/ REPORT OF INITIAL TREATMENT  FORM C-4    EMPLOYEE’S CLAIM - PROVIDE ALL INFORMATION REQUESTED   First Name  Loree Last Name  Jag Birthdate                    1955                Sex  female Claim Number   Home Address  [address] Age  62 y.o. 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 (if applicable)  Getting resident for breakfast     How did this injury or occupational disease occur? (Be specific an answer in detail. Use additional sheet if necessary)  Try to get resident up for breakfast   If you believe that you have an occupational disease, when did you first have knowledge of the disability and it relationship to your employment?  N/A Witnesses to the Accident  June      Nature of Injury or Occupational Disease  Workers' Compensation  Part(s) of Body Injured or Affected  Finger (L), N/A, N/A    I certify that the above is true and correct to the best of my knowledge and that I have provided this information in order to obtain the benefits of [place] " inclusive or Chapter [place] of [place]).  I hereby authorize any physician, chiropractor, surgeon, practitioner, or other person, any hospital, including [facility] hospital, any medical service organization, any insurance company, or other institution or organization to release to each other, any medical or other information, including benefits paid or payable, pertinent to this injury or disease, except information relative to diagnosis, treatment and/or counseling for AIDS, psychological conditions, alcohol or controlled substances, for which I must give specific authorization.  A Photostat of this authorization shall be as valid as the original.     Date   Place   Employee’s Signature   THIS REPORT MUST BE COMPLETED AND MAILED WITHIN 3 WORKING DAYS OF TREATMENT   Place  [facility]  Name of Facility  [facility]   Date  7/14/2018 Diagnosis  (M79.645) Pain of finger of left hand Is there evidence the injured employee was under the influence of alcohol and/or another controlled substance at the time of accident?   Hour  9:23 AM Description of Injury or Disease  The encounter diagnosis was Pain of finger of left hand.     Treatment     Have you advised the patient to remain off work five days or more?     X-Ray Findings      If Yes   From Date  To Date      From information given by the employee, together with medical evidence, can you directly connect this injury or occupational disease as job incurred?    If No Full Duty    Modified Duty      Is additional medical care by a physician indicated?    If Modified Duty, Specify any Limitations / Restrictions      Do you know of any previous injury or disease contributing to this condition or occupational disease?                                Date  7/14/2018 Print Doctor’s Name Dev Crawley M.D. I certify the employer’s copy of  this form was mailed on:   Address  [address] Insurer’s Use Only     [facility]  "[facility]  [address]    Provider’s Tax ID Number  [id number] Telephone  Dept: [phone]        antonette-BEN Baez M.D.   e-Signature: Dr. Cesar Hernandez, Medical Director Degree  MD        ORIGINAL-TREATING PHYSICIAN OR CHIROPRACTOR    PAGE 2-INSURER/TPA    PAGE 3-EMPLOYER    PAGE 4-EMPLOYEE             Form C-4 (rev10/07)              BRIEF DESCRIPTION OF RIGHTS AND BENEFITS  (Pursuant to NRS 616C.050)    Notice of Injury or Occupational Disease (Incident Report Form C-1): If an injury or occupational disease (OD) arises out of and in the  course of employment, you must provide written notice to your employer as soon as practicable, but no later than 7 days after the accident or  OD. Your employer shall maintain a sufficient supply of the required forms.    Claim for Compensation (Form C-4): If medical treatment is sought, the form C-4 is available at the place of initial treatment. A completed  \"Claim for Compensation\" (Form C-4) must be filed within 90 days after an accident or OD. The treating physician or chiropractor must,  within 3 working days after treatment, complete and mail to the employer, the employer's insurer and third-party , the Claim for  Compensation.    Medical Treatment: If you require medical treatment for your on-the-job injury or OD, you may be required to select a physician or  chiropractor from a list provided by your workers’ compensation insurer, if it has contracted with an Organization for Managed Care (MCO) or  Preferred Provider Organization (PPO) or providers of health care. If your employer has not entered into a contract with an MCO or PPO, you  may select a physician or chiropractor from the Panel of Physicians and Chiropractors. Any medical costs related to your industrial injury or  OD will be paid by your insurer.    Temporary Total Disability (TTD): If your doctor has certified that you are unable to work for a period of at least 5 consecutive days, or " 5  cumulative days in a 20-day period, or places restrictions on you that your employer does not accommodate, you may be entitled to TTD  compensation.    Temporary Partial Disability (TPD): If the wage you receive upon reemployment is less than the compensation for TTD to which you are  entitled, the insurer may be required to pay you TPD compensation to make up the difference. TPD can only be paid for a maximum of 24  months.    Permanent Partial Disability (PPD): When your medical condition is stable and there is an indication of a PPD as a result of your injury or  OD, within 30 days, your insurer must arrange for an evaluation by a rating physician or chiropractor to determine the degree of your PPD. The  amount of your PPD award depends on the date of injury, the results of the PPD evaluation and your age and wage.    Permanent Total Disability (PTD): If you are medically certified by a treating physician or chiropractor as permanently and totally disabled  and have been granted a PTD status by your insurer, you are entitled to receive monthly benefits not to exceed 66 2/3% of your average  monthly wage. The amount of your PTD payments is subject to reduction if you previously received a PPD award.    Vocational Rehabilitation Services: You may be eligible for vocational rehabilitation services if you are unable to return to the job due to a  permanent physical impairment or permanent restrictions as a result of your injury or occupational disease.    Transportation and Per Ariela Reimbursement: You may be eligible for travel expenses and per ariela associated with medical treatment.    Reopening: You may be able to reopen your claim if your condition worsens after claim closure.    Appeal Process: If you disagree with a written determination issued by the insurer or the insurer does not respond to your request, you may  appeal to the Department of Administration, , by following the instructions  contained in your determination letter. You must  appeal the determination within 70 days from the date of the determination letter at [address], [address], [place], [place]  [address], or [address], [address], [place], [place] [address]. If you disagree with the  decision, you may appeal to the  Department of Administration, . You must file your appeal within 30 days from the date of the  decision  letter at [address], [address], [place], [place] [address], or [address], [address], [place], [place] [address]. If you  disagree with a decision of an , you may file a petition for judicial review with the District Court. You must do so within 30  days of the Appeal Officer’s decision. You may be represented by an  at your own expense or you may contact the [facility] for possible  representation.    [place]  for Injured Workers (NAIW): If you disagree with a  decision, you may request that NAIW represent you  without charge at an  Hearing. For information regarding denial of benefits, you may contact the [facility] at: [address], [address], [place] [address], [phone], or [address], [place] [address], [phone]    To File a Complaint with the Division: If you wish to file a complaint with the  of the Division of Industrial Relations (DIR),  please contact the Workers’ Compensation Section, [address], [address], [place], [place] [address], telephone [phone]) [phone], or  [address] [facility] [address], [place] [address], telephone [phone]) [phone].    For assistance with Workers’ Compensation Issues: you may contact the Office of the Governor Consumer Health Assistance, [address], [address], [place], [place] [address], Toll Free 1-142.707.3685, Web site: http://govcha.[facility].nv.,  E-mail  Trinh@[email or web address].state.nv.[email or web address]                                                                                                                                                                                                                                   __________________________________________________________________                                                                   _________________                Employee Name / Signature                                                                                                                                                       Date                                                                                                                                                                                                     D-2 (rev. 10/07)

## 2018-07-14 NOTE — LETTER
[facility]  [address]  [place] [address]  Phone:  [phone] - Fax:  [phone]   [facility] Progress Report and Disability Certification  Date of Service: 7/14/2018   No Show:  No  Date / Time of Next Visit: 7/18/2018   Claim Information   Patient Name: Loree Rm  Claim Number:     Employer: [employer] Date of Injury: 7/14/2018     Insurer / TPA: ALEYDA  ID / SSN:     Occupation: Caregiver  Diagnosis: The encounter diagnosis was Pain of finger of left hand.    Medical Information   Related to Industrial Injury? Yes    Subjective Complaints:  DOI:  7/14    States that she was transferring an aggressive pt and she bent her left ring finger backwards this morning.         Now c/o dull achy left ring finger pain worse with palpation or movement.      Pt has not tried anything for the pain.  Pain is worse with bending it.   Denies fevers, chills, redness, swelling.    Objective Findings:      Right hand: She exhibits tenderness (at proximal rt 4th digit.  no swelling or erythema). Normal sensation noted. Normal strength noted.    Pre-Existing Condition(s):     Assessment:   Initial Visit    Status: Additional Care Required  Permanent Disability:No    Plan: Medication    Diagnostics: X-ray  Comments:no fracture    Comments:       Disability Information   Status: Released to Restricted Duty    From:  7/14/2018  Through: 7/18/2018 Restrictions are: Temporary   Physical Restrictions   Sitting:    Standing:    Stooping:    Bending:      Squatting:    Walking:    Climbing:    Pushing:      Pulling:    Other:    Reaching Above Shoulder (L):   Reaching Above Shoulder (R):       Reaching Below Shoulder (L):    Reaching Below Shoulder (R):      Not to exceed Weight Limits   Carrying(hrs):   Weight Limit(lb): < or = to 10 pounds  Comments:LEFT Lifting(hrs):   Weight  Limit(lb):   Comments:LEFT   Comments: Pain of finger of left hand    X-rays were personally  reviewed by myself.   There is no fracture.     Finger placed in malleable splint  OTC motrin, prn  Restrictions per D39  f/u 3-5 days    Repetitive Actions   Hands: i.e. Fine Manipulations from [place] hrs/day  Comments:LEFT   Feet: i.e. Operating Foot Controls:     Driving / Operate Machinery:     Physician Name: Dev Crawley M.D. Physician Signature: DEV Schafer M.D. e-Signature: Dr. Cesar Hernandez, Medical Director   Clinic Name / Location: [address] [address] Clinic Phone Number: Dept: [phone]   Appointment Time: 8:30 Am Visit Start Time: 9:23 AM   Check-In Time:  8:27 Am Visit Discharge Time:     Original-Treating Physician or Chiropractor    Page 2-Insurer/TPA    Page 3-Employer    Page 4-Employee

## 2018-07-14 NOTE — PROGRESS NOTES
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         Now c/o dull achy left ring finger pain worse with palpation or movement.      Pt has not tried anything for the pain.  Pain is worse with bending it.   Denies fevers, chills, redness, swelling.         Social History   Substance Use Topics   • Smoking status: Current Every Day Smoker     Packs/day: 0.25     Types: Cigarettes   • Smokeless tobacco: Never Used   • Alcohol use Yes      Comment: rare             Current Outpatient Prescriptions on File Prior to Visit   Medication Sig Dispense Refill   • ibuprofen (MOTRIN) 200 MG Tab Take 400 mg by mouth 3 times a day. Pt states she takes 400mg TID      • amlodipine (NORVASC) 5 MG Tab Take 1 Tab by mouth every day. 30 Tab 3   • ciprofloxacin (CIPRO) 500 MG Tab Take 500 mg by mouth 2 times a day. Pt started on 10/31/2017 for 5 day course     • diphenoxylate-atropine (LOMOTIL) 2.5-0.025 MG Tab Take 1 Tab by mouth 4 times a day as needed for Diarrhea.       No current facility-administered medications on file prior to visit.          Past medical history was unremarkable and not pertinent to current issue          Review of Systems   Constitutional: Negative for fever.   Respiratory: Negative for cough.    Cardiovascular: Negative for chest pain.   All other systems reviewed and are negative.         Objective:     Blood pressure 130/70, pulse 70, temperature 36.9 °C (98.4 °F), resp. rate 16, height 1.6 m (5' 3\"), weight 94.8 kg (209 lb), SpO2 95 %.    Physical Exam   Constitutional: pt is oriented to person, place, and time. Pt appears well-developed and well-nourished. No distress.   HENT:   Head: Normocephalic and atraumatic.   Eyes: Conjunctivae are normal.   Cardiovascular: Normal rate.  RRR.  " No murmer   Pulmonary/Chest: Effort normal.  CTAB  Musculoskeletal:        Right hand: She exhibits tenderness (at proximal rt 4th digit.  no swelling or erythema). Normal sensation noted. Normal strength noted.   Neurological: pt is alert and oriented to person, place, and time.   Skin: Skin is warm. Pt is not diaphoretic. No erythema.   Nursing note and vitals reviewed.    Narrative       7/14/2018 9:39 AM    HISTORY/REASON FOR EXAM:  Pain/Deformity Following Trauma.  Left fourth digit pain following hyperextension injury    TECHNIQUE/EXAM DESCRIPTION AND NUMBER OF VIEWS:  3 views of the LEFT fingers.    COMPARISON: None    FINDINGS:  No acute fracture or malalignment. Soft tissues are grossly unremarkable. There is mild osteophyte duration interphalangeal joints.   Impression       No acute fracture is identified.   Reading Provider Reading Date   Cailin Guthrie M.D. Jul 14, 2018   Signing Provider Signing Date Signing Time   Cailin Guthrie M.D. Jul 14, 2018  9:59 AM               Assessment/Plan:     1. Pain of finger of left hand    X-rays were personally reviewed by myself.   There is no fracture.     Finger placed in malleable splint  OTC motrin, prn  Restrictions per D39  f/u 3-5 days

## 2018-07-18 ENCOUNTER — OCCUPATIONAL MEDICINE (OUTPATIENT)
Dept: URGENT CARE | Facility: PHYSICIAN GROUP | Age: 63
End: 2018-07-18
Payer: COMMERCIAL

## 2018-07-18 ENCOUNTER — APPOINTMENT (OUTPATIENT)
Dept: URGENT CARE | Facility: PHYSICIAN GROUP | Age: 63
End: 2018-07-18
Payer: COMMERCIAL

## 2018-07-18 VITALS
TEMPERATURE: 97.5 F | HEART RATE: 82 BPM | RESPIRATION RATE: 16 BRPM | BODY MASS INDEX: 37.03 KG/M2 | SYSTOLIC BLOOD PRESSURE: 144 MMHG | OXYGEN SATURATION: 98 % | DIASTOLIC BLOOD PRESSURE: 80 MMHG | HEIGHT: 63 IN | WEIGHT: 209 LBS

## 2018-07-18 PROCEDURE — 99213 OFFICE O/P EST LOW 20 MIN: CPT | Mod: 29 | Performed by: PHYSICIAN ASSISTANT

## 2018-07-18 NOTE — LETTER
"   [facility] [place]  [address]  [place] [address]  Phone:  [phone] - Fax:  [phone]   [facility] Progress Report and Disability Certification  Date of Service: 7/18/2018   No Show:  No  Date / Time of Next Visit:     Claim Information   Patient Name: Lroee Rm  Claim Number:     Employer: [employer]  Date of Injury: 7/14/2018     Insurer / TPA: [employer]  ID / SSN:     Occupation: Caregiver  Diagnosis: The encounter diagnosis was Strain of left ring finger, initial encounter.    Medical Information   Related to Industrial Injury? Yes    Subjective Complaints:  DOI: 7/14/2018  Pt notes injury to finger while \"trying to get resident up for breakfast\"; after restriction and resting pt RTC stating: feels 100% and want to go back to work full duty now. Denies PMH of similar injury.    Objective Findings: Gen: AOx3; Head: NC AT; Eyes: PERRLA/EOM; Lungs: NLR; Cardiac: RR by periph pulse exam; Left hand: ring finger w/ normal exam, no erythema/, ecchymosis or edema. Normal full active range of motion. No tenderness to palpation. Normal alignment of all digits of left hand with flexion, extension, normal resistant strength of all digits. The left hand with flexion, extension; Neuro: NVID wrist. Capillary refill. +2 radial pulse   Pre-Existing Condition(s):     Assessment:   Condition Improved    Status: Discharged /  MMI  Permanent Disability:No    Plan:   Comments:MMI now    Diagnostics:      Comments:       Disability Information   Status: Released to Full Duty    From:     Through:   Restrictions are:     Physical Restrictions   Sitting:    Standing:    Stooping:    Bending:      Squatting:    Walking:    Climbing:    Pushing:      Pulling:    Other:    Reaching Above Shoulder (L):   Reaching Above Shoulder (R):       Reaching Below Shoulder (L):    Reaching Below Shoulder (R):      Not to exceed Weight Limits   Carrying(hrs):   Weight Limit(lb):   " Lifting(hrs):   Weight  Limit(lb):     Comments: MMI now    Repetitive Actions   Hands: i.e. Fine Manipulations from Grasping:     Feet: i.e. Operating Foot Controls:     Driving / Operate Machinery:     Physician Name: Stu Alvarado P.A.-C. Physician Signature: STU Layne P.A.-C. e-Signature: Dr. Cesar Hernandez, Medical Director   Clinic Name / Location: [address] [address] Clinic Phone Number: Dept: [phone]   Appointment Time: 9:00 Am Visit Start Time: 9:20 AM   Check-In Time:  8:51 Am Visit Discharge Time:  9:56 AM   Original-Treating Physician or Chiropractor    Page 2-Insurer/TPA    Page 3-Employer    Page 4-Employee

## 2018-07-18 NOTE — PROGRESS NOTES
"Subjective:      Loree Rm is a 62 y.o. female who presents with Work-Related Injury (DOI 7/14 L ring finger, better)      DOI: 7/14/2018  Pt notes injury to finger while \"trying to get resident up for breakfast\"; after restriction and resting pt RTC stating: feels 100% and want to go back to work full duty now. Denies PMH of similar injury.      HPI    ROS       Objective:     /80   Pulse 82   Temp 36.4 °C (97.5 °F)   Resp 16   Ht 1.6 m (5' 3\")   Wt 94.8 kg (209 lb)   SpO2 98%   BMI 37.02 kg/m²      Physical Exam    Gen: AOx3; Head: NC AT; Eyes: PERRLA/EOM; Lungs: NLR; Cardiac: RR by periph pulse exam; Left hand: ring finger w/ normal exam, no erythema/, ecchymosis or edema. Normal full active range of motion. No tenderness to palpation. Normal alignment of all digits of left hand with flexion, extension, normal resistant strength of all digits. The left hand with flexion, extension; Neuro: NVID wrist. Capillary refill. +2 radial pulse       Assessment/Plan:     1. Strain of left ring finger, initial encounter  MMI now      "

## 2018-08-31 ENCOUNTER — OFFICE VISIT (OUTPATIENT)
Dept: URGENT CARE | Facility: PHYSICIAN GROUP | Age: 63
End: 2018-08-31

## 2018-08-31 VITALS
SYSTOLIC BLOOD PRESSURE: 140 MMHG | RESPIRATION RATE: 16 BRPM | TEMPERATURE: 97.1 F | HEIGHT: 63 IN | WEIGHT: 203 LBS | DIASTOLIC BLOOD PRESSURE: 72 MMHG | HEART RATE: 71 BPM | BODY MASS INDEX: 35.97 KG/M2 | OXYGEN SATURATION: 97 %

## 2018-08-31 DIAGNOSIS — R10.9 RIGHT SIDED ABDOMINAL PAIN: ICD-10-CM

## 2018-08-31 DIAGNOSIS — R11.0 NAUSEA: ICD-10-CM

## 2018-08-31 DIAGNOSIS — R19.7 DIARRHEA, UNSPECIFIED TYPE: ICD-10-CM

## 2018-08-31 LAB
APPEARANCE UR: CLEAR
BILIRUB UR STRIP-MCNC: NEGATIVE MG/DL
COLOR UR AUTO: YELLOW
GLUCOSE UR STRIP.AUTO-MCNC: NEGATIVE MG/DL
KETONES UR STRIP.AUTO-MCNC: NEGATIVE MG/DL
LEUKOCYTE ESTERASE UR QL STRIP.AUTO: NEGATIVE
NITRITE UR QL STRIP.AUTO: NEGATIVE
PH UR STRIP.AUTO: 5 [PH] (ref 5–8)
PROT UR QL STRIP: NORMAL MG/DL
RBC UR QL AUTO: NEGATIVE
SP GR UR STRIP.AUTO: 1.02
UROBILINOGEN UR STRIP-MCNC: NEGATIVE MG/DL

## 2018-08-31 PROCEDURE — 81002 URINALYSIS NONAUTO W/O SCOPE: CPT | Performed by: FAMILY MEDICINE

## 2018-08-31 PROCEDURE — 99214 OFFICE O/P EST MOD 30 MIN: CPT | Performed by: FAMILY MEDICINE

## 2018-08-31 RX ORDER — ONDANSETRON 4 MG/1
4 TABLET, ORALLY DISINTEGRATING ORAL EVERY 8 HOURS PRN
Qty: 10 TAB | Refills: 0 | Status: SHIPPED | OUTPATIENT
Start: 2018-08-31 | End: 2019-03-05

## 2018-08-31 ASSESSMENT — ENCOUNTER SYMPTOMS
EYE REDNESS: 0
WEIGHT LOSS: 0
EYE DISCHARGE: 0

## 2018-08-31 ASSESSMENT — PAIN SCALES - GENERAL: PAINLEVEL: 6=MODERATE PAIN

## 2018-08-31 NOTE — PROGRESS NOTES
"Subjective:      Loree Rm is a 62 y.o. female who presents with Groin Pain (groing pain (R) that radiates to stomach )            2 days right groin pain. No hematuria. Associated vomiting and diarrhea last night.  No blood in emesis or stool.  No fever. No recent travel/camping/abx.  Past surgical history: Status post cholecystectomy and appendectomy remotely.  She has not had colonoscopy.  No past medical history of diverticulitis.  No other aggravating or alleviating factors.          Review of Systems   Constitutional: Negative for malaise/fatigue and weight loss.   Eyes: Negative for discharge and redness.   Genitourinary: Negative for dysuria, frequency and urgency.   Skin: Negative for itching and rash.       .  Medications, Allergies, and current problem list reviewed today in Epic  She does work in a long-term care facility.  No known C. difficile exposures.   Objective:     /72   Pulse 71   Temp 36.2 °C (97.1 °F)   Resp 16   Ht 1.6 m (5' 3\")   Wt 92.1 kg (203 lb)   SpO2 97%   BMI 35.96 kg/m²      Physical Exam   Constitutional: She is oriented to person, place, and time. She appears well-developed. No distress.   HENT:   Head: Normocephalic.   Eyes: Conjunctivae are normal.   Neck: Neck supple.   Cardiovascular: Normal rate, regular rhythm and normal heart sounds.    Pulmonary/Chest: Effort normal and breath sounds normal.   Abdominal: Soft.   Mild right mid right side suprapubic tenderness.  No guarding, no rebound, no peritoneal signs.  No mass., right lower quadrant and   Neurological: She is alert and oriented to person, place, and time.   Skin: Skin is warm and dry.               Assessment/Plan:   Urinalysis reviewed.  Negative blood, negative nitrite, negative leukocyte esterase    1. Right sided abdominal pain  POCT Urinalysis   2. Nausea  ondansetron (ZOFRAN ODT) 4 MG TABLET DISPERSIBLE   3. Diarrhea, unspecified type       Differential diagnosis, natural history, supportive care, " and indications for immediate follow-up discussed at length.     Unclear etiology.  I did offer to initiate a workup with laboratory studies and imaging however patient elects to hold at this time.  We will try to treat her nausea and she will contact me if symptoms persist or worsen.

## 2019-03-05 ENCOUNTER — OFFICE VISIT (OUTPATIENT)
Dept: URGENT CARE | Facility: PHYSICIAN GROUP | Age: 64
End: 2019-03-05
Payer: COMMERCIAL

## 2019-03-05 VITALS
TEMPERATURE: 97.3 F | SYSTOLIC BLOOD PRESSURE: 144 MMHG | DIASTOLIC BLOOD PRESSURE: 88 MMHG | HEART RATE: 79 BPM | WEIGHT: 203 LBS | RESPIRATION RATE: 18 BRPM | HEIGHT: 63 IN | OXYGEN SATURATION: 95 % | BODY MASS INDEX: 35.97 KG/M2

## 2019-03-05 DIAGNOSIS — R07.89 CHEST DISCOMFORT: ICD-10-CM

## 2019-03-05 PROCEDURE — 99214 OFFICE O/P EST MOD 30 MIN: CPT | Performed by: FAMILY MEDICINE

## 2019-03-05 RX ORDER — ASPIRIN 81 MG/1
81 TABLET, CHEWABLE ORAL DAILY
COMMUNITY
End: 2020-12-03

## 2019-03-05 RX ORDER — ASPIRIN 81 MG/1
324 TABLET, CHEWABLE ORAL ONCE
Status: COMPLETED | OUTPATIENT
Start: 2019-03-05 | End: 2019-03-05

## 2019-03-05 RX ADMIN — ASPIRIN 324 MG: 81 TABLET, CHEWABLE ORAL at 12:38

## 2019-03-05 ASSESSMENT — ENCOUNTER SYMPTOMS
SHORTNESS OF BREATH: 0
DIZZINESS: 0
CHILLS: 0
FEVER: 0
FOCAL WEAKNESS: 0
HEMOPTYSIS: 0
ORTHOPNEA: 0

## 2019-03-05 NOTE — PROGRESS NOTES
"Subjective:      Loree Rm is a 63 y.o. female who presents with Lightheadedness (this AM , hot flashes, chest pain, nausea vomitting)      - This is a very pleasant, well and non-toxic appearing 63 y.o. female with complaints of at work this morning and was having on/off spells of feeling clammy/weak & dizzy and wound have to take frequent breaks to rest. ~ 11am starting feeling non radiating Lt sided chest pains on and off           ALLERGIES:  Pcn [penicillins]     PMH:  No past medical history on file.     PSH:  Past Surgical History:   Procedure Laterality Date   • ABDOMINAL HYSTERECTOMY TOTAL     • APPENDECTOMY     • OPEN REDUCTION     • PRIMARY C SECTION         MEDS:    Current Outpatient Prescriptions:   •  aspirin (ASA) 81 MG Chew Tab chewable tablet, Take 81 mg by mouth every day., Disp: , Rfl:   •  amlodipine (NORVASC) 5 MG Tab, Take 1 Tab by mouth every day., Disp: 30 Tab, Rfl: 3    ** I have documented what I find to be significant in regards to past medical, social, family and surgical history  in my HPI or under PMH/PSH/FH review section, otherwise it is contributory **         HPI    Review of Systems   Constitutional: Negative for chills and fever.   Respiratory: Negative for hemoptysis and shortness of breath.    Cardiovascular: Negative for chest pain and orthopnea.   Neurological: Negative for dizziness and focal weakness.          Objective:     /88   Pulse 79   Temp 36.3 °C (97.3 °F) (Temporal)   Resp 18   Ht 1.6 m (5' 3\")   Wt 92.1 kg (203 lb)   SpO2 95%   BMI 35.96 kg/m²      Physical Exam   Constitutional: She appears well-developed. No distress.   HENT:   Head: Normocephalic and atraumatic.   Mouth/Throat: Oropharynx is clear and moist.   Eyes: Conjunctivae are normal.   Neck: Neck supple.   Cardiovascular: Regular rhythm.    No murmur heard.  Pulmonary/Chest: Effort normal and breath sounds normal. No respiratory distress.   Neurological: She is alert. She exhibits normal " muscle tone.   Skin: Skin is warm and dry.   Psychiatric: She has a normal mood and affect. Judgment normal.   Nursing note and vitals reviewed.              Assessment/Plan:         1. Chest discomfort  aspirin (ASA) chewable tab 324 mg    EKG - Clinic Performed     EKG: NSR, no acute changes         Whitley asked patient to go directly to ER today to evaluate her symptoms further

## 2019-03-05 NOTE — LETTER
March 5, 2019         Patient: Loree Rm   YOB: 1955   Date of Visit: 3/5/2019           To Whom it May Concern:    Loree Rm was seen in my clinic on 3/5/2019. She may return to work in 1-3 days.    If you have any questions or concerns, please don't hesitate to call.        Sincerely,           Fausto Knutson M.D.  Electronically Signed

## 2019-03-06 ENCOUNTER — OFFICE VISIT (OUTPATIENT)
Dept: URGENT CARE | Facility: PHYSICIAN GROUP | Age: 64
End: 2019-03-06
Payer: COMMERCIAL

## 2019-03-06 VITALS
HEART RATE: 78 BPM | DIASTOLIC BLOOD PRESSURE: 78 MMHG | BODY MASS INDEX: 34.72 KG/M2 | RESPIRATION RATE: 16 BRPM | OXYGEN SATURATION: 94 % | TEMPERATURE: 98.1 F | WEIGHT: 196 LBS | SYSTOLIC BLOOD PRESSURE: 110 MMHG

## 2019-03-06 DIAGNOSIS — R42 LIGHT HEADEDNESS: ICD-10-CM

## 2019-03-06 DIAGNOSIS — F17.200 SMOKER: ICD-10-CM

## 2019-03-06 DIAGNOSIS — R07.9 CHEST PAIN, UNSPECIFIED TYPE: ICD-10-CM

## 2019-03-06 DIAGNOSIS — R06.02 SOB (SHORTNESS OF BREATH): ICD-10-CM

## 2019-03-06 PROCEDURE — 99406 BEHAV CHNG SMOKING 3-10 MIN: CPT | Performed by: PHYSICIAN ASSISTANT

## 2019-03-06 PROCEDURE — 99214 OFFICE O/P EST MOD 30 MIN: CPT | Mod: 25 | Performed by: PHYSICIAN ASSISTANT

## 2019-03-06 NOTE — PROGRESS NOTES
"Subjective:      Loree Rm is a 63 y.o. female who presents with Chest Pain (x 2 days )            Patient is a 63-year-old female who presents to urgent care with continued fatigue after episode of chest pain yesterday.  Patient reports that she was in she developed substernal and left-sided chest discomfort, associated nausea, lightheadedness and shortness of breath.  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  Patient further had evaluation at urgent care with essentially unchanged EKG from today and was instructed to present to the emergency room for cardiac workup.  Patient reports that she did not go to the emergency room and that she should have \"recheck \"today.  Patient reports the episode of chest pain earlier today that was more like a pressure with associated fatigue.  She also reports slight shortness of breath this morning as well.  Patient readily admits that she feels that this may be a pulled muscle as the pain came on with exertion and movement.      Chest Pain    This is a new problem. The current episode started yesterday. The onset quality is sudden. The problem has been waxing and waning. The pain is present in the substernal region and lateral region. The pain is moderate. The quality of the pain is described as pressure and sharp. The pain does not radiate. Associated symptoms include dizziness, malaise/fatigue, nausea and shortness of breath. Pertinent negatives include no abdominal pain, cough, fever, headaches, orthopnea, palpitations or syncope. Risk factors include sedentary lifestyle, smoking/tobacco exposure and post-menopausal.       Review of Systems   Constitutional: Positive for malaise/fatigue. Negative for fever.   HENT: Negative for congestion and sore throat.    Respiratory: Positive for shortness of breath. Negative for cough.    Cardiovascular: Positive for chest pain. Negative for palpitations, orthopnea, leg swelling and " syncope.   Gastrointestinal: Positive for nausea. Negative for abdominal pain and diarrhea.   Skin: Negative for rash.   Neurological: Positive for dizziness. Negative for headaches.   All other systems reviewed and are negative.         Objective:     /78   Pulse 78   Temp 36.7 °C (98.1 °F) (Temporal)   Resp 16   Wt 88.9 kg (196 lb)   SpO2 94%   BMI 34.72 kg/m²      Physical Exam   Constitutional: She is oriented to person, place, and time. She appears well-developed and well-nourished. No distress.   HENT:   Head: Normocephalic and atraumatic.   Right Ear: External ear normal.   Left Ear: External ear normal.   Mouth/Throat: Oropharynx is clear and moist. No oropharyngeal exudate.   Eyes: Pupils are equal, round, and reactive to light. Conjunctivae and EOM are normal.   Neck: Normal range of motion. Neck supple. No tracheal deviation present.   Cardiovascular: Normal rate and regular rhythm.    No murmur heard.  Pulmonary/Chest: Effort normal and breath sounds normal. No respiratory distress. She exhibits no tenderness.   Slightly diminished at the bases.  Unable to reproduce chest discomfort.   Musculoskeletal: Normal range of motion. She exhibits no edema.   Neurological: She is alert and oriented to person, place, and time. Coordination normal.   Skin: Skin is warm. No rash noted.   Psychiatric: She has a normal mood and affect. Her behavior is normal. Judgment and thought content normal.   Vitals reviewed.            EKG: NSR at a rate of 71, T wave inversion in lead V1- compared to yesterday-without changes- same.   Without other acute ST changes.  Normal axis    Assessment/Plan:     1. Chest pain, unspecified type  - EKG - Clinic Performed    2. Light headedness  3. SOB (shortness of breath)  4.Smoker    Smoking cessation was discussed today for longer than 3 min. OTC Smoking cessation aids were discussed and pt. will consider such, however is not ready to quit at this time.     Patient's EKG is  essentially unchanged since yesterday-however patient's episode yesterday of chest pain with movement of a patient with associated nausea, diaphoresis, dizziness is concerning for possible cardiac origin.  Patient again was instructed to go to the emergency room today of which she was in agreement.  Patient is with her family friend today and is agreeable to drive her to the emergency room.  Patient is uncertain which emergency room as she will present to however is agreeable.  Patient was stable throughout the duration of the visit today.  Of note patient left without active chest pain.

## 2019-03-09 ASSESSMENT — ENCOUNTER SYMPTOMS
FEVER: 0
COUGH: 0
ABDOMINAL PAIN: 0
SHORTNESS OF BREATH: 1
HEADACHES: 0
DIZZINESS: 1
SYNCOPE: 0
SORE THROAT: 0
DIARRHEA: 0
NAUSEA: 1
PALPITATIONS: 0
ORTHOPNEA: 0

## 2020-07-17 NOTE — LETTER
August 31, 2018         Patient: Loree Rm   YOB: 1955   Date of Visit: 8/31/2018           To Whom it May Concern:    Loree Rm was seen in my clinic on 8/31/2018. Please excuse today.     Sincerely,           Blair Roper M.D.  Electronically Signed      Left message to call back

## 2020-09-09 ENCOUNTER — OFFICE VISIT (OUTPATIENT)
Dept: URGENT CARE | Facility: PHYSICIAN GROUP | Age: 65
End: 2020-09-09
Payer: MEDICAID

## 2020-09-09 ENCOUNTER — APPOINTMENT (OUTPATIENT)
Dept: RADIOLOGY | Facility: IMAGING CENTER | Age: 65
End: 2020-09-09
Attending: FAMILY MEDICINE
Payer: MEDICAID

## 2020-09-09 VITALS
BODY MASS INDEX: 35.44 KG/M2 | HEIGHT: 63 IN | OXYGEN SATURATION: 97 % | WEIGHT: 200 LBS | RESPIRATION RATE: 18 BRPM | DIASTOLIC BLOOD PRESSURE: 92 MMHG | SYSTOLIC BLOOD PRESSURE: 138 MMHG | TEMPERATURE: 97.6 F | HEART RATE: 79 BPM

## 2020-09-09 DIAGNOSIS — S89.92XA INJURY OF LEFT KNEE, INITIAL ENCOUNTER: ICD-10-CM

## 2020-09-09 DIAGNOSIS — S83.92XA SPRAIN OF LEFT KNEE, UNSPECIFIED LIGAMENT, INITIAL ENCOUNTER: ICD-10-CM

## 2020-09-09 PROCEDURE — 99214 OFFICE O/P EST MOD 30 MIN: CPT | Performed by: FAMILY MEDICINE

## 2020-09-09 PROCEDURE — 73564 X-RAY EXAM KNEE 4 OR MORE: CPT | Mod: TC,FY,LT | Performed by: FAMILY MEDICINE

## 2020-09-09 RX ORDER — PREDNISONE 20 MG/1
TABLET ORAL
Qty: 9 TAB | Refills: 0 | Status: SHIPPED | OUTPATIENT
Start: 2020-09-09 | End: 2020-12-03

## 2020-09-09 ASSESSMENT — PAIN SCALES - GENERAL: PAINLEVEL: 7=MODERATE-SEVERE PAIN

## 2020-09-09 NOTE — PROGRESS NOTES
Chief Complaint:    Chief Complaint   Patient presents with   • Knee Pain     left knee twisting injury 9/7/2020       History of Present Illness:    This is a new problem. She had twisting type of injury to left knee 9/7/2020, pain can be severe medially. She did not impact her left knee. She took Ibuprofen for pain and is using a soft knee brace on left knee with sub-optimal help. Not getting better.      Review of Systems:    Constitutional: Negative for fever, chills, and diaphoresis.   Eyes: Negative for change in vision, photophobia, pain, redness, and discharge.  ENT: Negative for ear pain, ear discharge, hearing loss, tinnitus, nasal congestion, nosebleeds, and sore throat.    Respiratory: Negative for cough, hemoptysis, sputum production, shortness of breath, wheezing, and stridor.    Cardiovascular: Negative for chest pain, palpitations, orthopnea, claudication, leg swelling, and PND.   Gastrointestinal: Negative for abdominal pain, nausea, vomiting, diarrhea, constipation, blood in stool, and melena.   Genitourinary: Negative for dysuria, urinary urgency, urinary frequency, hematuria, and flank pain.   Musculoskeletal: See HPI.  Skin: Negative for rash and itching.   Neurological: Negative for dizziness, tingling, tremors, sensory change, speech change, focal weakness, seizures, loss of consciousness, and headaches.   Endo: Negative for polydipsia.   Heme: Does not bruise/bleed easily.   Psychiatric/Behavioral: Negative for depression, suicidal ideas, hallucinations, memory loss and substance abuse. The patient is not nervous/anxious and does not have insomnia.        Past Medical History:    History reviewed. No pertinent past medical history.    Past Surgical History:    Past Surgical History:   Procedure Laterality Date   • ABDOMINAL HYSTERECTOMY TOTAL     • APPENDECTOMY     • OPEN REDUCTION     • PRIMARY C SECTION       Social History:    Social History     Socioeconomic History   • Marital status:  "     Spouse name: Not on file   • Number of children: Not on file   • Years of education: Not on file   • Highest education level: Not on file   Occupational History   • Not on file   Social Needs   • Financial resource strain: Not on file   • Food insecurity     Worry: Not on file     Inability: Not on file   • Transportation needs     Medical: Not on file     Non-medical: Not on file   Tobacco Use   • Smoking status: Current Every Day Smoker     Packs/day: 0.25     Types: Cigarettes   • Smokeless tobacco: Never Used   Substance and Sexual Activity   • Alcohol use: Yes     Comment: rare   • Drug use: No   • Sexual activity: Yes     Partners: Male   Lifestyle   • Physical activity     Days per week: Not on file     Minutes per session: Not on file   • Stress: Not on file   Relationships   • Social connections     Talks on phone: Not on file     Gets together: Not on file     Attends Synagogue service: Not on file     Active member of club or organization: Not on file     Attends meetings of clubs or organizations: Not on file     Relationship status: Not on file   • Intimate partner violence     Fear of current or ex partner: Not on file     Emotionally abused: Not on file     Physically abused: Not on file     Forced sexual activity: Not on file   Other Topics Concern   • Not on file   Social History Narrative   • Not on file     Family History:    History reviewed. No pertinent family history.    Medications:    Current Outpatient Medications on File Prior to Visit   Medication Sig Dispense Refill   • aspirin (ASA) 81 MG Chew Tab chewable tablet Take 81 mg by mouth every day.       No current facility-administered medications on file prior to visit.      Allergies:    Allergies   Allergen Reactions   • Pcn [Penicillins] Anaphylaxis       Vitals:    Vitals:    09/09/20 1334   BP: 138/92   Pulse: 79   Resp: 18   Temp: 36.4 °C (97.6 °F)   SpO2: 97%   Weight: 90.7 kg (200 lb)   Height: 1.6 m (5' 3\") "       Physical Exam:    Constitutional: Vital signs reviewed. Appears well-developed and well-nourished. No acute distress.   Eyes: Sclera white, conjunctivae clear.  ENT: External ears normal. Hearing normal.  Cardiovascular: Peripheral pulses 2+. No edema.   Pulmonary/Chest: Respirations non-labored.  Musculoskeletal: Antalgic gait due to left knee pain. Severely decreased left knee flexion due to pain. Moderately-severely tender to palpation left knee at ACL and medially. Mild swelling left knee medially.  Neurological: Alert and oriented to person, place, and time. Muscle tone normal. Coordination normal. Light touch and sensation normal.   Skin: No rashes or lesions. Warm, dry, normal turgor.  Psychiatric: Normal mood and affect. Behavior is normal. Judgment and thought content normal.       Diagnostics:    DX-KNEE COMPLETE 4+ LEFT  Order: [id number]  Status:  Final result   Visible to patient:  No (not released) Next appt:  None Dx:  Injury of left knee, initial encounter  Details    Reading Physician Reading Date Result Priority   Harjinder Aguilar M.D.  [phone] 9/9/2020 Urgent Care      Narrative & Impression        9/9/2020 1:50 PM     HISTORY/REASON FOR EXAM:  Pain/Deformity Following Trauma;        TECHNIQUE/EXAM DESCRIPTION AND NUMBER OF VIEWS:  4 views of the LEFT knee.     COMPARISON: None     FINDINGS:  There is no evidence of fracture or dislocation. There is tibiofemoral joint space narrowing, particularly medially. There is spurring of the tibial plateau. There is mild spurring of the patella. Atherosclerotic calcification is seen. Sclerosis in the   distal femur may be related to an intramedullary infarct.        IMPRESSION:        Degenerative changes.     Atherosclerotic plaque.     No evidence of acute fracture or dislocation.     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              I personally reviewed the images. Rad report reviewed with her and copy  of report to her.      Assessment / Plan:    1. Injury of left knee, initial encounter  - DX-KNEE COMPLETE 4+ LEFT; Future  - predniSONE (DELTASONE) 20 MG Tab; 2 TABS BY MOUTH ONCE A DAY ON DAYS 1-3, 1 TAB ONCE A DAY ON DAYS 4-6. TAKE WITH FOOD.  Dispense: 9 Tab; Refill: 0  - REFERRAL TO ORTHOPEDICS    2. Sprain of left knee, unspecified ligament, initial encounter  - predniSONE (DELTASONE) 20 MG Tab; 2 TABS BY MOUTH ONCE A DAY ON DAYS 1-3, 1 TAB ONCE A DAY ON DAYS 4-6. TAKE WITH FOOD.  Dispense: 9 Tab; Refill: 0  - REFERRAL TO ORTHOPEDICS      Discussed with her DDX, management options, and risks, benefits, and alternatives to treatment plan agreed upon.    Rec'd relative rest. Declines crutches.    She was agreeable to hinged knee brace, but we were out of her size.    She will continue with her soft knee brace on left knee as needed.    Agreeable to medication prescribed for anti-inflammatory effect.    I have ordered a referral to Orthopedic Physician for follow-up.    She will return to urgent care if needed.

## 2021-05-26 PROBLEM — E11.9 TYPE 2 DIABETES MELLITUS, WITHOUT LONG-TERM CURRENT USE OF INSULIN (HCC): Status: ACTIVE | Noted: 2021-05-26

## 2021-05-26 PROBLEM — R93.89 ABNORMAL CHEST CT: Status: ACTIVE | Noted: 2021-05-26

## 2021-05-26 PROBLEM — R09.02 HYPOXIA: Status: ACTIVE | Noted: 2021-05-26

## 2021-05-26 PROBLEM — D72.829 LEUKOCYTOSIS: Status: ACTIVE | Noted: 2021-05-26

## 2021-10-12 PROBLEM — Z91.89 AT RISK FOR OBSTRUCTIVE SLEEP APNEA: Status: ACTIVE | Noted: 2021-10-12

## 2021-10-12 PROBLEM — M17.11 OSTEOARTHRITIS OF RIGHT KNEE: Status: ACTIVE | Noted: 2021-10-12

## 2021-10-12 PROBLEM — Z72.0 TOBACCO ABUSE: Status: ACTIVE | Noted: 2021-10-12
